# Patient Record
Sex: MALE | Race: BLACK OR AFRICAN AMERICAN | NOT HISPANIC OR LATINO | Employment: UNEMPLOYED | URBAN - METROPOLITAN AREA
[De-identification: names, ages, dates, MRNs, and addresses within clinical notes are randomized per-mention and may not be internally consistent; named-entity substitution may affect disease eponyms.]

---

## 2024-07-09 ENCOUNTER — HOSPITAL ENCOUNTER (OUTPATIENT)
Facility: HOSPITAL | Age: 34
Setting detail: OBSERVATION
Discharge: HOME | End: 2024-07-10
Attending: STUDENT IN AN ORGANIZED HEALTH CARE EDUCATION/TRAINING PROGRAM | Admitting: HOSPITALIST
Payer: COMMERCIAL

## 2024-07-09 ENCOUNTER — APPOINTMENT (OUTPATIENT)
Dept: CT IMAGING | Facility: HOSPITAL | Age: 34
End: 2024-07-09
Payer: COMMERCIAL

## 2024-07-09 DIAGNOSIS — R07.9 CHEST PAIN, UNSPECIFIED TYPE: ICD-10-CM

## 2024-07-09 DIAGNOSIS — I26.99 PULMONARY EMBOLISM WITHOUT ACUTE COR PULMONALE, UNSPECIFIED CHRONICITY, UNSPECIFIED PULMONARY EMBOLISM TYPE: Primary | ICD-10-CM

## 2024-07-09 LAB
BASOPHILS # BLD AUTO: 0.02 10*3/MM3 (ref 0–0.2)
BASOPHILS NFR BLD AUTO: 0.3 % (ref 0–1.5)
DEPRECATED RDW RBC AUTO: 43.1 FL (ref 37–54)
EOSINOPHIL # BLD AUTO: 0.34 10*3/MM3 (ref 0–0.4)
EOSINOPHIL NFR BLD AUTO: 4.3 % (ref 0.3–6.2)
ERYTHROCYTE [DISTWIDTH] IN BLOOD BY AUTOMATED COUNT: 13 % (ref 12.3–15.4)
HCT VFR BLD AUTO: 42.3 % (ref 37.5–51)
HGB BLD-MCNC: 14 G/DL (ref 13–17.7)
IMM GRANULOCYTES # BLD AUTO: 0.04 10*3/MM3 (ref 0–0.05)
IMM GRANULOCYTES NFR BLD AUTO: 0.5 % (ref 0–0.5)
LYMPHOCYTES # BLD AUTO: 2.6 10*3/MM3 (ref 0.7–3.1)
LYMPHOCYTES NFR BLD AUTO: 33.1 % (ref 19.6–45.3)
MCH RBC QN AUTO: 29 PG (ref 26.6–33)
MCHC RBC AUTO-ENTMCNC: 33.1 G/DL (ref 31.5–35.7)
MCV RBC AUTO: 87.6 FL (ref 79–97)
MONOCYTES # BLD AUTO: 0.76 10*3/MM3 (ref 0.1–0.9)
MONOCYTES NFR BLD AUTO: 9.7 % (ref 5–12)
NEUTROPHILS NFR BLD AUTO: 4.09 10*3/MM3 (ref 1.7–7)
NEUTROPHILS NFR BLD AUTO: 52.1 % (ref 42.7–76)
PLATELET # BLD AUTO: 189 10*3/MM3 (ref 140–450)
PMV BLD AUTO: 10.7 FL (ref 6–12)
RBC # BLD AUTO: 4.83 10*6/MM3 (ref 4.14–5.8)
WBC NRBC COR # BLD AUTO: 7.85 10*3/MM3 (ref 3.4–10.8)

## 2024-07-09 PROCEDURE — 93005 ELECTROCARDIOGRAM TRACING: CPT | Performed by: STUDENT IN AN ORGANIZED HEALTH CARE EDUCATION/TRAINING PROGRAM

## 2024-07-09 PROCEDURE — 85610 PROTHROMBIN TIME: CPT | Performed by: STUDENT IN AN ORGANIZED HEALTH CARE EDUCATION/TRAINING PROGRAM

## 2024-07-09 PROCEDURE — 83690 ASSAY OF LIPASE: CPT | Performed by: STUDENT IN AN ORGANIZED HEALTH CARE EDUCATION/TRAINING PROGRAM

## 2024-07-09 PROCEDURE — 85730 THROMBOPLASTIN TIME PARTIAL: CPT | Performed by: STUDENT IN AN ORGANIZED HEALTH CARE EDUCATION/TRAINING PROGRAM

## 2024-07-09 PROCEDURE — 80050 GENERAL HEALTH PANEL: CPT | Performed by: STUDENT IN AN ORGANIZED HEALTH CARE EDUCATION/TRAINING PROGRAM

## 2024-07-09 PROCEDURE — 99285 EMERGENCY DEPT VISIT HI MDM: CPT

## 2024-07-09 PROCEDURE — 93010 ELECTROCARDIOGRAM REPORT: CPT | Performed by: INTERNAL MEDICINE

## 2024-07-09 PROCEDURE — 83036 HEMOGLOBIN GLYCOSYLATED A1C: CPT | Performed by: NURSE PRACTITIONER

## 2024-07-09 PROCEDURE — 84484 ASSAY OF TROPONIN QUANT: CPT | Performed by: STUDENT IN AN ORGANIZED HEALTH CARE EDUCATION/TRAINING PROGRAM

## 2024-07-09 PROCEDURE — 71275 CT ANGIOGRAPHY CHEST: CPT

## 2024-07-09 NOTE — Clinical Note
Level of Care: Telemetry [5]   Diagnosis: Pulmonary embolism [[id number]]   Admitting Physician: PRATIBHA ARNDT [[id number]]

## 2024-07-10 ENCOUNTER — APPOINTMENT (OUTPATIENT)
Dept: ULTRASOUND IMAGING | Facility: HOSPITAL | Age: 34
End: 2024-07-10
Payer: COMMERCIAL

## 2024-07-10 VITALS
SYSTOLIC BLOOD PRESSURE: 132 MMHG | DIASTOLIC BLOOD PRESSURE: 83 MMHG | OXYGEN SATURATION: 97 % | RESPIRATION RATE: 16 BRPM | HEIGHT: 70 IN | WEIGHT: 283.4 LBS | TEMPERATURE: 97.1 F | HEART RATE: 74 BPM | BODY MASS INDEX: 40.57 KG/M2

## 2024-07-10 PROBLEM — E78.2 MIXED HYPERLIPIDEMIA: Status: ACTIVE | Noted: 2024-01-10

## 2024-07-10 PROBLEM — S43.409A SPRAIN OF SHOULDER: Status: ACTIVE | Noted: 2024-01-10

## 2024-07-10 PROBLEM — I48.91 ATRIAL FIBRILLATION: Status: ACTIVE | Noted: 2019-06-26

## 2024-07-10 PROBLEM — G62.9 NEUROPATHY: Status: ACTIVE | Noted: 2024-01-10

## 2024-07-10 PROBLEM — I26.99 PULMONARY EMBOLISM: Status: ACTIVE | Noted: 2024-07-10

## 2024-07-10 PROBLEM — I10 ESSENTIAL HYPERTENSION: Status: ACTIVE | Noted: 2019-06-26

## 2024-07-10 LAB
ALBUMIN SERPL-MCNC: 4 G/DL (ref 3.5–5.2)
ALBUMIN/GLOB SERPL: 1.3 G/DL
ALP SERPL-CCNC: 79 U/L (ref 39–117)
ALT SERPL W P-5'-P-CCNC: 18 U/L (ref 1–41)
AMPHET+METHAMPHET UR QL: POSITIVE
AMPHETAMINES UR QL: NEGATIVE
ANION GAP SERPL CALCULATED.3IONS-SCNC: 14.5 MMOL/L (ref 5–15)
APTT PPP: 24.7 SECONDS (ref 24.3–38.1)
AST SERPL-CCNC: 17 U/L (ref 1–40)
BARBITURATES UR QL SCN: NEGATIVE
BENZODIAZ UR QL SCN: NEGATIVE
BILIRUB SERPL-MCNC: 0.4 MG/DL (ref 0–1.2)
BILIRUB UR QL STRIP: NEGATIVE
BUN SERPL-MCNC: 9 MG/DL (ref 6–20)
BUN/CREAT SERPL: 8.5 (ref 7–25)
BUPRENORPHINE SERPL-MCNC: NEGATIVE NG/ML
CALCIUM SPEC-SCNC: 9 MG/DL (ref 8.6–10.5)
CANNABINOIDS SERPL QL: POSITIVE
CHLORIDE SERPL-SCNC: 108 MMOL/L (ref 98–107)
CLARITY UR: CLEAR
CO2 SERPL-SCNC: 20.5 MMOL/L (ref 22–29)
COCAINE UR QL: NEGATIVE
COLOR UR: YELLOW
CREAT SERPL-MCNC: 1.06 MG/DL (ref 0.76–1.27)
EGFRCR SERPLBLD CKD-EPI 2021: 95.6 ML/MIN/1.73
GLOBULIN UR ELPH-MCNC: 3 GM/DL
GLUCOSE SERPL-MCNC: 160 MG/DL (ref 65–99)
GLUCOSE UR STRIP-MCNC: NEGATIVE MG/DL
HBA1C MFR BLD: 7.85 % (ref 4.8–5.6)
HGB UR QL STRIP.AUTO: NEGATIVE
INR PPP: 1.02 (ref 0.9–1.1)
KETONES UR QL STRIP: ABNORMAL
LEUKOCYTE ESTERASE UR QL STRIP.AUTO: NEGATIVE
LIPASE SERPL-CCNC: 28 U/L (ref 13–60)
METHADONE UR QL SCN: NEGATIVE
NITRITE UR QL STRIP: NEGATIVE
OPIATES UR QL: NEGATIVE
OXYCODONE UR QL SCN: NEGATIVE
PCP UR QL SCN: NEGATIVE
PH UR STRIP.AUTO: 6.5 [PH] (ref 4.5–8)
POTASSIUM SERPL-SCNC: 3.6 MMOL/L (ref 3.5–5.2)
PROT SERPL-MCNC: 7 G/DL (ref 6–8.5)
PROT UR QL STRIP: NEGATIVE
PROTHROMBIN TIME: 13.4 SECONDS (ref 12.1–15)
QT INTERVAL: 343 MS
QTC INTERVAL: 402 MS
SODIUM SERPL-SCNC: 143 MMOL/L (ref 136–145)
SP GR UR STRIP: 1.02 (ref 1–1.03)
TRICYCLICS UR QL SCN: NEGATIVE
TROPONIN T SERPL HS-MCNC: 10 NG/L
TSH SERPL DL<=0.05 MIU/L-ACNC: 0.5 UIU/ML (ref 0.27–4.2)
UROBILINOGEN UR QL STRIP: ABNORMAL

## 2024-07-10 PROCEDURE — 80306 DRUG TEST PRSMV INSTRMNT: CPT | Performed by: NURSE PRACTITIONER

## 2024-07-10 PROCEDURE — 93970 EXTREMITY STUDY: CPT

## 2024-07-10 PROCEDURE — 25010000002 ENOXAPARIN PER 10 MG: Performed by: NURSE PRACTITIONER

## 2024-07-10 PROCEDURE — G0378 HOSPITAL OBSERVATION PER HR: HCPCS

## 2024-07-10 PROCEDURE — 96372 THER/PROPH/DIAG INJ SC/IM: CPT

## 2024-07-10 PROCEDURE — 81003 URINALYSIS AUTO W/O SCOPE: CPT | Performed by: NURSE PRACTITIONER

## 2024-07-10 PROCEDURE — 99235 HOSP IP/OBS SAME DATE MOD 70: CPT | Performed by: HOSPITALIST

## 2024-07-10 PROCEDURE — 25510000001 IOPAMIDOL PER 1 ML: Performed by: STUDENT IN AN ORGANIZED HEALTH CARE EDUCATION/TRAINING PROGRAM

## 2024-07-10 PROCEDURE — 94799 UNLISTED PULMONARY SVC/PX: CPT

## 2024-07-10 RX ORDER — ACETAMINOPHEN 650 MG/1
650 SUPPOSITORY RECTAL EVERY 4 HOURS PRN
Status: DISCONTINUED | OUTPATIENT
Start: 2024-07-10 | End: 2024-07-10 | Stop reason: HOSPADM

## 2024-07-10 RX ORDER — BISACODYL 5 MG/1
5 TABLET, DELAYED RELEASE ORAL DAILY PRN
Status: DISCONTINUED | OUTPATIENT
Start: 2024-07-10 | End: 2024-07-10 | Stop reason: HOSPADM

## 2024-07-10 RX ORDER — SODIUM CHLORIDE 9 MG/ML
40 INJECTION, SOLUTION INTRAVENOUS AS NEEDED
Status: DISCONTINUED | OUTPATIENT
Start: 2024-07-10 | End: 2024-07-10 | Stop reason: HOSPADM

## 2024-07-10 RX ORDER — ACETAMINOPHEN 325 MG/1
650 TABLET ORAL EVERY 4 HOURS PRN
Status: DISCONTINUED | OUTPATIENT
Start: 2024-07-10 | End: 2024-07-10 | Stop reason: HOSPADM

## 2024-07-10 RX ORDER — SODIUM CHLORIDE 0.9 % (FLUSH) 0.9 %
10 SYRINGE (ML) INJECTION EVERY 12 HOURS SCHEDULED
Status: DISCONTINUED | OUTPATIENT
Start: 2024-07-10 | End: 2024-07-10 | Stop reason: HOSPADM

## 2024-07-10 RX ORDER — HEPARIN SODIUM 10000 [USP'U]/100ML
11.5 INJECTION, SOLUTION INTRAVENOUS
Status: DISCONTINUED | OUTPATIENT
Start: 2024-07-10 | End: 2024-07-10 | Stop reason: SDUPTHER

## 2024-07-10 RX ORDER — ENOXAPARIN SODIUM 150 MG/ML
1 INJECTION SUBCUTANEOUS EVERY 12 HOURS
Status: DISCONTINUED | OUTPATIENT
Start: 2024-07-10 | End: 2024-07-10

## 2024-07-10 RX ORDER — BISACODYL 10 MG
10 SUPPOSITORY, RECTAL RECTAL DAILY PRN
Status: DISCONTINUED | OUTPATIENT
Start: 2024-07-10 | End: 2024-07-10 | Stop reason: HOSPADM

## 2024-07-10 RX ORDER — AMOXICILLIN 250 MG
2 CAPSULE ORAL 2 TIMES DAILY PRN
Status: DISCONTINUED | OUTPATIENT
Start: 2024-07-10 | End: 2024-07-10 | Stop reason: HOSPADM

## 2024-07-10 RX ORDER — GABAPENTIN 300 MG/1
300 CAPSULE ORAL 2 TIMES DAILY
Status: DISCONTINUED | OUTPATIENT
Start: 2024-07-10 | End: 2024-07-10 | Stop reason: HOSPADM

## 2024-07-10 RX ORDER — KETOROLAC TROMETHAMINE 30 MG/ML
15 INJECTION, SOLUTION INTRAMUSCULAR; INTRAVENOUS ONCE
Status: DISCONTINUED | OUTPATIENT
Start: 2024-07-10 | End: 2024-07-10

## 2024-07-10 RX ORDER — TRAMADOL HYDROCHLORIDE 50 MG/1
50 TABLET ORAL EVERY 6 HOURS PRN
Status: DISCONTINUED | OUTPATIENT
Start: 2024-07-10 | End: 2024-07-10 | Stop reason: HOSPADM

## 2024-07-10 RX ORDER — DILTIAZEM HYDROCHLORIDE 60 MG/1
60 TABLET, FILM COATED ORAL EVERY 12 HOURS SCHEDULED
Status: DISCONTINUED | OUTPATIENT
Start: 2024-07-10 | End: 2024-07-10 | Stop reason: HOSPADM

## 2024-07-10 RX ORDER — ATORVASTATIN CALCIUM 20 MG/1
20 TABLET, FILM COATED ORAL DAILY
COMMUNITY

## 2024-07-10 RX ORDER — POLYETHYLENE GLYCOL 3350 17 G/17G
17 POWDER, FOR SOLUTION ORAL DAILY PRN
Status: DISCONTINUED | OUTPATIENT
Start: 2024-07-10 | End: 2024-07-10 | Stop reason: HOSPADM

## 2024-07-10 RX ORDER — ONDANSETRON 4 MG/1
4 TABLET, ORALLY DISINTEGRATING ORAL EVERY 6 HOURS PRN
Status: DISCONTINUED | OUTPATIENT
Start: 2024-07-10 | End: 2024-07-10 | Stop reason: HOSPADM

## 2024-07-10 RX ORDER — SODIUM CHLORIDE 0.9 % (FLUSH) 0.9 %
10 SYRINGE (ML) INJECTION AS NEEDED
Status: DISCONTINUED | OUTPATIENT
Start: 2024-07-10 | End: 2024-07-10 | Stop reason: HOSPADM

## 2024-07-10 RX ORDER — ONDANSETRON 2 MG/ML
4 INJECTION INTRAMUSCULAR; INTRAVENOUS EVERY 6 HOURS PRN
Status: DISCONTINUED | OUTPATIENT
Start: 2024-07-10 | End: 2024-07-10 | Stop reason: HOSPADM

## 2024-07-10 RX ORDER — HEPARIN SODIUM 5000 [USP'U]/ML
5000 INJECTION, SOLUTION INTRAVENOUS; SUBCUTANEOUS AS NEEDED
Status: DISCONTINUED | OUTPATIENT
Start: 2024-07-10 | End: 2024-07-10 | Stop reason: SDUPTHER

## 2024-07-10 RX ORDER — ATORVASTATIN CALCIUM 20 MG/1
20 TABLET, FILM COATED ORAL DAILY
Status: DISCONTINUED | OUTPATIENT
Start: 2024-07-10 | End: 2024-07-10 | Stop reason: HOSPADM

## 2024-07-10 RX ORDER — DILTIAZEM HYDROCHLORIDE 60 MG/1
60 TABLET, FILM COATED ORAL ONCE
COMMUNITY

## 2024-07-10 RX ORDER — ACETAMINOPHEN 160 MG/5ML
650 SOLUTION ORAL EVERY 4 HOURS PRN
Status: DISCONTINUED | OUTPATIENT
Start: 2024-07-10 | End: 2024-07-10 | Stop reason: HOSPADM

## 2024-07-10 RX ORDER — HEPARIN SODIUM 5000 [USP'U]/ML
10000 INJECTION, SOLUTION INTRAVENOUS; SUBCUTANEOUS ONCE
Status: DISCONTINUED | OUTPATIENT
Start: 2024-07-10 | End: 2024-07-10 | Stop reason: SDUPTHER

## 2024-07-10 RX ORDER — GABAPENTIN 300 MG/1
300 CAPSULE ORAL 2 TIMES DAILY
COMMUNITY

## 2024-07-10 RX ADMIN — Medication 10 ML: at 02:53

## 2024-07-10 RX ADMIN — Medication 10 ML: at 09:38

## 2024-07-10 RX ADMIN — CARIPRAZINE 6 MG: 1.5 CAPSULE, GELATIN COATED ORAL at 08:34

## 2024-07-10 RX ADMIN — METFORMIN HYDROCHLORIDE 1000 MG: 500 TABLET, FILM COATED ORAL at 08:35

## 2024-07-10 RX ADMIN — ATORVASTATIN CALCIUM 20 MG: 20 TABLET, FILM COATED ORAL at 08:34

## 2024-07-10 RX ADMIN — IOPAMIDOL 100 ML: 755 INJECTION, SOLUTION INTRAVENOUS at 00:20

## 2024-07-10 RX ADMIN — ENOXAPARIN SODIUM 135 MG: 150 INJECTION SUBCUTANEOUS at 02:44

## 2024-07-10 RX ADMIN — TRAMADOL HYDROCHLORIDE 50 MG: 50 TABLET ORAL at 05:31

## 2024-07-10 RX ADMIN — DILTIAZEM HYDROCHLORIDE 60 MG: 60 TABLET, FILM COATED ORAL at 08:35

## 2024-07-10 RX ADMIN — GABAPENTIN 300 MG: 300 CAPSULE ORAL at 08:34

## 2024-07-10 NOTE — CASE MANAGEMENT/SOCIAL WORK
Case Management Discharge Note      Final Note: Discharged home.         Selected Continued Care - Discharged on 7/10/2024 Admission date: 7/9/2024 - Discharge disposition: Home or Self Care      Destination    No services have been selected for the patient.                Durable Medical Equipment    No services have been selected for the patient.                Dialysis/Infusion    No services have been selected for the patient.                Home Medical Care    No services have been selected for the patient.                Therapy    No services have been selected for the patient.                Community Resources    No services have been selected for the patient.                Community & DME    No services have been selected for the patient.                         Final Discharge Disposition Code: 01 - home or self-care

## 2024-07-10 NOTE — ED PROVIDER NOTES
Subjective   History of Present Illness  Pt is a 33 y.o. male with PMH as listed who presents for   Chief Complaint   Patient presents with    Chest Pain     Right sided chest pain, hx of derek PE and L DVT       Patient is a 33-year-old male presents for right-sided chest pain for the past day with increased pain with deep breaths.  Not short of breath but pain takes his breath away when he takes a deep breath.  No fever, nausea vomiting no diaphoresis.  History of prior PE 1 year ago and is on Xarelto for this as well as for history of A-fib.  States he is compliant with his medications.  His current symptoms feel similar to when he presented for PE 1 year ago.     Review of Systems    History reviewed. No pertinent past medical history.    Allergies   Allergen Reactions    Ritalin [Methylphenidate] Anaphylaxis    Bee Venom Hives and Swelling       History reviewed. No pertinent surgical history.    History reviewed. No pertinent family history.    Social History     Socioeconomic History    Marital status: Single           Objective   Physical Exam  Constitutional:       Appearance: Normal appearance.   HENT:      Head: Normocephalic and atraumatic.      Mouth/Throat:      Mouth: Mucous membranes are moist.      Pharynx: Oropharynx is clear.   Eyes:      Conjunctiva/sclera: Conjunctivae normal.   Cardiovascular:      Rate and Rhythm: Normal rate and regular rhythm.      Heart sounds: Normal heart sounds. Murmur: tender right side chest wall.   Pulmonary:      Effort: Pulmonary effort is normal.      Breath sounds: Normal breath sounds.   Chest:      Chest wall: Tenderness present.   Abdominal:      General: Abdomen is flat.   Musculoskeletal:      Cervical back: Neck supple.   Skin:     General: Skin is warm and dry.   Neurological:      Mental Status: He is alert.   Psychiatric:         Mood and Affect: Mood normal.         Procedures           ED Course  ED Course as of 07/10/24 0057   Tue Jul 09, 2024   8407  Patient is a 33-year-old male presents for right-sided chest pain for the past day with increased pain with deep breaths.  Not short of breath but pain takes his breath away when he takes a deep breath.  No fever, nausea vomiting no diaphoresis.  History of prior PE 1 year ago and is on Xarelto for this as well as for history of A-fib.  States he is compliant with his medications.  His current symptoms feel similar to when he presented for PE 1 year ago.  Will obtain CBC CMP lipase coags troponin EKG and CTA chest. [JF]   Wed Jul 10, 2024   0048 Reassessment while CTA pending and lab work resulted discussed normal lab work including troponin.  Patient states that his chest pain has been ongoing for several months if not longer and he has had negative stress test and other cardiac workups in [facility].  Patient resting comfortably during my assessment. [JF]   0056 CTA does show right-sided PE due to the patient's pain being on the right side.  No prior CT available for comparison will treat as Xarelto failure and started on heparin bolus and admit for further management.  Discussed with Kinsey Jack, hospital service who agrees to admit patient for further care.  Spoke with patient, he understands and agrees with plan of care.  All questions answered. [JF]      ED Course User Index  [JF] Reinaldo Castillo MD                                             Medical Decision Making  My differential diagnosis for chest pain includes but is not limited to:  Muscle strain, costochondritis, myositis, pleurisy, rib fracture, intercostal neuritis, herpes zoster, tumor, myocardial infarction, coronary syndrome, unstable angina, angina, aortic dissection, mitral valve prolapse, pericarditis, palpitations, pulmonary embolus, pneumonia, pneumothorax, lung cancer, GERD, esophagitis, esophageal spasm      Problems Addressed:  Chest pain, unspecified type: complicated acute illness or injury  Pulmonary embolism without acute cor  pulmonale, unspecified chronicity, unspecified pulmonary embolism type: complicated acute illness or injury    Amount and/or Complexity of Data Reviewed  Labs: ordered. Decision-making details documented in ED Course.  Radiology: ordered.     Details: CT shows right-sided PE  ECG/medicine tests: ordered.     Details: EKG  7/9/2024 2324  Rhythm sinus, rate 82  Normal axis, normal intervals, no ST changes  EKG interpreted by me contemporaneously by me with care  Discussion of management or test interpretation with external provider(s): Spoke with NISSA Lou for hospital service who agrees to admit patient for further management.    Risk  Prescription drug management.  Decision regarding hospitalization.        Final diagnoses:   Pulmonary embolism without acute cor pulmonale, unspecified chronicity, unspecified pulmonary embolism type   Chest pain, unspecified type       ED Disposition  ED Disposition       ED Disposition   Decision to Admit    Condition   --    Comment   Level of Care: Telemetry [5]   Admitting Physician: PRATIBHA ARNDT [[id number]]                 No follow-up provider specified.       Medication List      No changes were made to your prescriptions during this visit.            Reinaldo Castillo MD  07/10/24 0057

## 2024-07-10 NOTE — NURSING NOTE
1020: Patient still has not returned to the hospital. His discharge packet and Eliquis are at the nursing station.   Removing patient from EPIC and housekeeping will be cleaning the room.

## 2024-07-10 NOTE — DISCHARGE SUMMARY
"Kirk Peterson  1990  [id number]    Hospitalists Discharge Summary    Date of Admission: 7/9/2024  Date of Discharge:  7/10/2024    Primary Discharge Diagnoses:  RLL PE  DVT of the Left Femoral Vein  Illicit Drug Use  Suspected Medical Non-compliance    Secondary Discharge Diagnoses:  Diabetes Mellitus, Type 2 in Obese A1c of 7.85 Body mass index is 40.66 kg/m².  Essential Hypertension  Dyslipidemia    History of Present Illness (taken from H&P):  The patient is a 33-year-old male who presented to the emergency department secondary to right sided chest pain that has been ongoing for \"years\" but worsened in the past 2 to 3 days. He decided to come to ER today since he was already here with his friend's mother who is admitted (who he lives with). He reports first episode of PE and DVT was in 2016 and was told he would take blood thinners for life.  Approximately 1 year ago he had recurrence of PE and was placed on Xarelto 15 mg tablets.  He reports that he did not feel they were working because his left leg remained swollen so he stopped taking it approximately 2 months ago.  2 weeks ago he was admitted at [facility] for \"mental health crisis\" and was incidentally diagnosed with recurrent PE and started on 20 mg Xarelto.  He notes no starter pack was given and that he has been compliant for approximately 1-1/2 to 2 weeks on Xarelto 20 mg tablets.  He reports he has never had hematology referral for further investigation.  He also reports PE and DVTs were unprovoked.     He reports that he has had a very low appetite over the past 2 to 3 years and is lost 200 pounds.     He vapes and smokes cigars, drinks alcohol occasionally and uses marijuana \"as much as I can\" with last use approximately 3 days ago.  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       Only significant lab in ER is glucose 160.  CTA chest shows likely right lower lobe small PE.  Admission was requested due to Xarelto failure.     He has " "a history of PE/DVT, atrial fibrillation, hyperlipidemia, hypertension, DM 2, obesity     He otherwise currently denies f/c/headache/rhinorrhea/nasal congestion/lightheadedness/syncopal sensation/cough/soa/n/v/d/chest pain/abdominal pain/recent illness/sick exposures/change in bowel or bladder habits/bloody emesis or bloody stools or any other new concerns.    Hospital Course:  Mr. Peterson was admitted to the medical surgical floor.  Lower extremity Dopplers were positive for a DVT in the left femoral vein.  He was initially started on Lovenox, however I changed this to Eliquis.  I I have had some clinical experience with Xarelto of \"failure\", however, as noted by my colleague overnight, there is suspicion he has not taken his medication.  This came to late this morning as I was trying to ascertain his ability to fill his prescription over in [place] since [place].  He became very argumentative with me repeatedly telling me that he cannot fill his prescriptions in [place] and he may not be able to get over to [place] to  his prescription.  I explained that when you have blood clots, you need to be compliant with your medications.  He again began very argumentative with me telling me I was \"rude\" for waking him up at 9:30 in the morning and that he would  his medications.  As I was preparing his discharge, he left the hospital.  His prescription for Eliquis was sent to [facility] in [place] where he said he was fills his prescriptions.    PCP  Patient Care Team:  Provider, No Known as PCP - General    Consults:   Consults       No orders found for last 30 day(s).            Operations and Procedures Performed:       US Venous Doppler Lower Extremity Bilateral (duplex)    Result Date: 7/10/2024  Narrative: US VENOUS DOPPLER LOWER EXTREMITY BILATERAL (DUPLEX) Date of Exam: 7/10/2024 7:41 AM EDT Indication: r/o DVT. Comparison: None available. Technique:  Routine gray scale, " color flow and spectral Doppler analysis of bilateral lower extremities. A complete venous study was performed with image documentation obtained per protocol. Findings: Exam is positive for deep venous thrombosis of the left femoral vein extending through the popliteal vein and involving the anterior and posterior tibial veins as well as the peroneal vein. There is no evidence of left common femoral, deep femoral, or saphenous vein thrombosis. There is normal compressibility and color flow within the right lower extremity venous structures.     Impression: Impression: Findings are positive for deep venous thrombosis involving the left femoral vein and extending distally to the anterior tibial, posterior tibial, and peroneal veins. No evidence of right lower extremity venous thrombosis. Electronically Signed: Farrukh Toro MD  7/10/2024 8:40 AM EDT  Workstation ID: [id number]    CT Angiogram Chest    Result Date: 7/10/2024  Narrative: CT ANGIOGRAM CHEST Date of Exam: 7/10/2024 12:05 AM EDT Indication: chest pain, hx of PE with similar presentation 1 year ago. Comparison: None available. Technique: CTA of the chest was performed before and after the uneventful intravenous administration of iodinated contrast. Reconstructed coronal and sagittal images were also obtained. In addition, a 3-D volume rendered image was created for interpretation. Automated exposure control and iterative reconstruction methods were used. Findings: There is a suboptimal contrast bolus in the pulmonary artery. Despite this, there does appear to be high likelihood of a small pulmonary embolism within a segmental branch pulmonary artery to the right lower lobe posteriorly best seen on images 88-90. No  large central or saddle embolus. No evidence of right heart strain. There is no pneumothorax, pleural effusion or focal airspace consolidation. No significant pleural disease. Airways are patent. Thyroid, trachea, esophagus, heart size, aorta,  aortic branch vessels, main pulmonary artery and coronary arteries appear within normal limits. No coronary artery calcification. No mediastinal lymphadenopathy or pericardial effusion. No acute findings in the superficial soft tissues or within the limited images of the upper abdomen. No acute osseous abnormality or destructive bone lesion. No significant thoracic degenerative changes.     Impression: Impression: Suboptimal contrast bolus, but there is high likelihood of a small pulmonary embolism within a segmental branch pulmonary artery to the right lower lobe. No evidence of right heart strain. Electronically Signed: Farrukh Chisholm MD  7/10/2024 12:34 AM EDT  Workstation ID: [id number]     Allergies:  is allergic to ritalin [methylphenidate] and bee venom.    Srinivasa  reviewed    Discharge Medications:     Discharge Medications        New Medications        Instructions Start Date   apixaban 5 MG tablet tablet  Commonly known as: ELIQUIS   Take 2 tablets by mouth Every 12 (Twelve) Hours for 7 days, THEN 1 tablet Every 12 (Twelve) Hours for 14 days. Indications: DVT/PE (active thrombosis)   Start Date: July 10, 2024            Continue These Medications        Instructions Start Date   atorvastatin 20 MG tablet  Commonly known as: LIPITOR   20 mg, Oral, Daily      Cariprazine HCl 3 MG capsule capsule  Commonly known as: VRAYLAR   10 mg, Oral, Daily      dilTIAZem 60 MG tablet  Commonly known as: CARDIZEM   60 mg, Oral, Once      gabapentin 300 MG capsule  Commonly known as: NEURONTIN   300 mg, Oral, 2 Times Daily      metFORMIN 1000 MG tablet  Commonly known as: GLUCOPHAGE   1,000 mg, Oral, 2 Times Daily With Meals             Stop These Medications      rivaroxaban 20 MG tablet  Commonly known as: XARELTO              Last Lab Results:   Lab Results (most recent)       Procedure Component Value Units Date/Time    Hemoglobin A1c [[id number]]  (Abnormal) Collected: 07/09/24 2341    Specimen: Blood Updated: 07/10/24  0319     Hemoglobin A1C 7.85 %     Narrative:      Hemoglobin A1C Ranges:    Increased Risk for Diabetes  5.7% to 6.4%  Diabetes                     >= 6.5%  Diabetic Goal                < 7.0%    TSH [[id number]]  (Normal) Collected: 07/09/24 2341    Specimen: Blood Updated: 07/10/24 0246     TSH 0.503 uIU/mL     Urine Drug Screen - Urine, Clean Catch [[id number]]  (Abnormal) Collected: 07/10/24 0205    Specimen: Urine, Clean Catch Updated: 07/10/24 0239     THC, Screen, Urine Positive     Phencyclidine (PCP), Urine Negative     Cocaine Screen, Urine Negative     Methamphetamine, Ur Negative     Opiate Screen Negative     Amphetamine Screen, Urine Positive     Benzodiazepine Screen, Urine Negative     Tricyclic Antidepressants Screen Negative     Methadone Screen, Urine Negative     Barbiturates Screen, Urine Negative     Oxycodone Screen, Urine Negative     Buprenorphine, Screen, Urine Negative    Narrative:      Cutoff For Drugs Screened:    Amphetamines               500 ng/ml  Barbiturates               200 ng/ml  Benzodiazepines            150 ng/ml  Cocaine                    150 ng/ml  Methadone                  200 ng/ml  Opiates                    100 ng/ml  Phencyclidine               25 ng/ml  THC                         50 ng/ml  Methamphetamine            500 ng/ml  Tricyclic Antidepressants  300 ng/ml  Oxycodone                  100 ng/ml  Buprenorphine               10 ng/ml    The normal value for all drugs tested is negative. This report includes unconfirmed screening results, with the cutoff values listed, to be used for medical treatment purposes only.  Unconfirmed results must not be used for non-medical purposes such as employment or legal testing.  Clinical consideration should be applied to any drug of abuse test, particularly when unconfirmed results are used.      Urinalysis With Microscopic If Indicated (No Culture) - Urine, Clean Catch [[id number]]  (Abnormal) Collected: 07/10/24 0205     Specimen: Urine, Clean Catch Updated: 07/10/24 0224     Color, UA Yellow     Appearance, UA Clear     pH, UA 6.5     Specific Gravity, UA 1.020     Glucose, UA Negative     Ketones, UA Trace     Bilirubin, UA Negative     Blood, UA Negative     Protein, UA Negative     Leuk Esterase, UA Negative     Nitrite, UA Negative     Urobilinogen, UA 2.0 E.U./dL    Narrative:      Urine microscopic not indicated.    aPTT [[id number]]  (Normal) Collected: 07/09/24 2341    Specimen: Blood Updated: 07/10/24 0103     PTT 24.7 seconds     Narrative:      PTT = The equivalent PTT values for the therapeutic range of heparin levels at 0.1 to 0.7 U/ml are 53 to 110 seconds.      Lipase [[id number]]  (Normal) Collected: 07/09/24 2341    Specimen: Blood Updated: 07/10/24 0009     Lipase 28 U/L     High Sensitivity Troponin T [[id number]]  (Normal) Collected: 07/09/24 2341    Specimen: Blood Updated: 07/10/24 0009     HS Troponin T 10 ng/L     Narrative:      High Sensitive Troponin T Reference Range:  <14.0 ng/L- Negative Female for AMI  <22.0 ng/L- Negative Male for AMI  >=14 - Abnormal Female indicating possible myocardial injury.  >=22 - Abnormal Male indicating possible myocardial injury.   Clinicians would have to utilize clinical acumen, EKG, Troponin, and serial changes to determine if it is an Acute Myocardial Infarction or myocardial injury due to an underlying chronic condition.         Comprehensive Metabolic Panel [[id number]]  (Abnormal) Collected: 07/09/24 2341    Specimen: Blood Updated: 07/10/24 0009     Glucose 160 mg/dL      BUN 9 mg/dL      Creatinine 1.06 mg/dL      Sodium 143 mmol/L      Potassium 3.6 mmol/L      Chloride 108 mmol/L      CO2 20.5 mmol/L      Calcium 9.0 mg/dL      Total Protein 7.0 g/dL      Albumin 4.0 g/dL      ALT (SGPT) 18 U/L      AST (SGOT) 17 U/L      Alkaline Phosphatase 79 U/L      Total Bilirubin 0.4 mg/dL      Globulin 3.0 gm/dL      A/G Ratio 1.3 g/dL      BUN/Creatinine Ratio 8.5     Anion  Gap 14.5 mmol/L      eGFR 95.6 mL/min/1.73     Narrative:      GFR Normal >60  Chronic Kidney Disease <60  Kidney Failure <15      Protime-INR [[id number]]  (Normal) Collected: 07/09/24 2341    Specimen: Blood Updated: 07/10/24 0007     Protime 13.4 Seconds      INR 1.02    Narrative:      Therapeutic Ranges for INR: 2.0-3.0 (PT 20-30)                              2.5-3.5 (PT 25-34)    CBC & Differential [[id number]]  (Normal) Collected: 07/09/24 2341    Specimen: Blood Updated: 07/09/24 2349    Narrative:      The following orders were created for panel order CBC & Differential.  Procedure                               Abnormality         Status                     ---------                               -----------         ------                     CBC Auto Differential[[id number]]        Normal              Final result                 Please view results for these tests on the individual orders.    CBC Auto Differential [[id number]]  (Normal) Collected: 07/09/24 2341    Specimen: Blood Updated: 07/09/24 2349     WBC 7.85 10*3/mm3      RBC 4.83 10*6/mm3      Hemoglobin 14.0 g/dL      Hematocrit 42.3 %      MCV 87.6 fL      MCH 29.0 pg      MCHC 33.1 g/dL      RDW 13.0 %      RDW-SD 43.1 fl      MPV 10.7 fL      Platelets 189 10*3/mm3      Neutrophil % 52.1 %      Lymphocyte % 33.1 %      Monocyte % 9.7 %      Eosinophil % 4.3 %      Basophil % 0.3 %      Immature Grans % 0.5 %      Neutrophils, Absolute 4.09 10*3/mm3      Lymphocytes, Absolute 2.60 10*3/mm3      Monocytes, Absolute 0.76 10*3/mm3      Eosinophils, Absolute 0.34 10*3/mm3      Basophils, Absolute 0.02 10*3/mm3      Immature Grans, Absolute 0.04 10*3/mm3           Imaging Results (Most Recent)       Procedure Component Value Units Date/Time    US Venous Doppler Lower Extremity Bilateral (duplex) [[id number]] Collected: 07/10/24 0838     Updated: 07/10/24 0843    Narrative:      US VENOUS DOPPLER LOWER EXTREMITY BILATERAL (DUPLEX)    Date of Exam:  7/10/2024 7:41 AM EDT    Indication: r/o DVT.    Comparison: None available.    Technique:  Routine gray scale, color flow and spectral Doppler analysis of bilateral lower extremities. A complete venous study was performed with image documentation obtained per protocol.      Findings:  Exam is positive for deep venous thrombosis of the left femoral vein extending through the popliteal vein and involving the anterior and posterior tibial veins as well as the peroneal vein. There is no evidence of left common femoral, deep femoral, or   saphenous vein thrombosis.    There is normal compressibility and color flow within the right lower extremity venous structures.      Impression:      Impression:  Findings are positive for deep venous thrombosis involving the left femoral vein and extending distally to the anterior tibial, posterior tibial, and peroneal veins.    No evidence of right lower extremity venous thrombosis.        Electronically Signed: Farrukh Toro MD    7/10/2024 8:40 AM EDT    Workstation ID: [id number]    CT Angiogram Chest [[id number]] Collected: 07/10/24 0031     Updated: 07/10/24 0037    Narrative:      CT ANGIOGRAM CHEST    Date of Exam: 7/10/2024 12:05 AM EDT    Indication: chest pain, hx of PE with similar presentation 1 year ago.    Comparison: None available.    Technique: CTA of the chest was performed before and after the uneventful intravenous administration of iodinated contrast. Reconstructed coronal and sagittal images were also obtained. In addition, a 3-D volume rendered image was created for   interpretation. Automated exposure control and iterative reconstruction methods were used.      Findings:  There is a suboptimal contrast bolus in the pulmonary artery. Despite this, there does appear to be high likelihood of a small pulmonary embolism within a segmental branch pulmonary artery to the right lower lobe posteriorly best seen on images 88-90. No   large central or saddle embolus. No  evidence of right heart strain. There is no pneumothorax, pleural effusion or focal airspace consolidation. No significant pleural disease. Airways are patent.    Thyroid, trachea, esophagus, heart size, aorta, aortic branch vessels, main pulmonary artery and coronary arteries appear within normal limits. No coronary artery calcification. No mediastinal lymphadenopathy or pericardial effusion.    No acute findings in the superficial soft tissues or within the limited images of the upper abdomen. No acute osseous abnormality or destructive bone lesion. No significant thoracic degenerative changes.      Impression:      Impression:  Suboptimal contrast bolus, but there is high likelihood of a small pulmonary embolism within a segmental branch pulmonary artery to the right lower lobe. No evidence of right heart strain.        Electronically Signed: Farrukh Chisholm MD    7/10/2024 12:34 AM EDT    Workstation ID: [id number]            PROCEDURES      Condition on Discharge:  Stable    Physical Exam at Discharge  Vital Signs  Temp:  [97.1 °F (36.2 °C)-98.3 °F (36.8 °C)] 97.1 °F (36.2 °C)  Heart Rate:  [74-88] 74  Resp:  [16-18] 16  BP: (132-141)/(83-96) 132/83    Physical Exam:  Physical Exam   Constitutional: Patient appears well-developed and well-nourished and in no acute distress   Cardiovascular: Regular rate, regular rhythm, S1 normal and S2 normal.  Exam reveals no gallop and no friction rub.  No murmur heard.  Pulmonary/Chest: Lungs are clear to auscultation bilaterally. No respiratory distress. No wheezes. No rhonchi. No rales.   Abdominal: Morbidly obese. Soft. Bowel sounds are normal. There is no tenderness.   Musculoskeletal: Normal Muscle tone  Extremities: No edema.   Neurological: Cranial nerves II-XII are grossly intact with no focal deficits.    Discharge Disposition  Home    Visiting Nurse:    No     Home PT/OT:  No     Home Safety Evaluation:  No     DME  None    Discharge Diet:      Dietary Orders (From  admission, onward)       Start     Ordered    07/10/24 0158  Diet: Cardiac, Diabetic; Healthy Heart (2-3 Na+); Consistent Carbohydrate; Fluid Consistency: Thin (IDDSI 0)  Diet Effective Now        References:    Diet Order [place]   Question Answer Comment   Diets: Cardiac    Diets: Diabetic    Cardiac Diet: Healthy Heart (2-3 Na+)    Diabetic Diet: Consistent Carbohydrate    Fluid Consistency: Thin (IDDSI 0)        07/10/24 0157                    Activity at Discharge:  As tolerated    Follow-up Appointments  No future appointments.  Additional Instructions for the Follow-ups that You Need to Schedule       Discharge Follow-up with PCP   As directed       Currently Documented PCP:    Provider, No Known    PCP Phone Number:    [phone]     Follow Up Details: Needs to establish within 2 weeks.                Test Results Pending at Discharge  None     Sawyer Hazel MD  07/10/24  09:30 EDT    Time: <30 minutes

## 2024-07-10 NOTE — PROGRESS NOTES
"[facility] Clinical Pharmacy Services: Enoxaparin Consult    Kirk Peterson has a pharmacy consult to dose full-dose enoxaparin per Laura AZAR's request.     Indication: DVT/PE (active thrombosis) - Xarelto failure  Home Anticoagulation: Xarelto 20 mg oral daily     Relevant clinical data and objective history reviewed:  33 y.o. male 177.8 cm (70\") 131 kg (288 lb)   Body mass index is 41.32 kg/m².   Results from last 7 days   Lab Units 07/09/24  2341   PLATELETS 10*3/mm3 189     Estimated Creatinine Clearance: 136.1 mL/min (by C-G formula based on SCr of 1.06 mg/dL).    Assessment/Plan    Will start patient on  135 (1mg/kg) subcutaneous every 12 hours, adjusted for renal function. Consult order will be discontinued but pharmacy will continue to follow.     Lester Hawkins III, [facility]  Clinical Pharmacist    "

## 2024-07-10 NOTE — PLAN OF CARE
Goal Outcome Evaluation:  Plan of Care Reviewed With: patient        Progress: no change  Outcome Evaluation: New ER admit, VSS, rested some, lovenox started for PE, tolerating diet

## 2024-07-10 NOTE — NURSING NOTE
0950: Patient walked out. This RN was preparing pts discharge packet and the patient walked out.   The plan was to give the patient Eliquis to go home with for him to take at noon.  The IV was removed prior to the patient leaving, 2X2 gauze and tape was applied.   Anurag Barraza and Dr. Hazel informed.

## 2024-07-10 NOTE — H&P
"[facility] HOSPITALIST     Provider, No Known    CHIEF COMPLAINT: right sided chest pain    HISTORY OF PRESENT ILLNESS:  The patient is a 33-year-old male who presented to the emergency department secondary to right sided chest pain that has been ongoing for \"years\" but worsened in the past 2 to 3 days. He decided to come to ER today since he was already here with his friend's mother who is admitted (who he lives with). He reports first episode of PE and DVT was in 2016 and was told he would take blood thinners for life.  Approximately 1 year ago he had recurrence of PE and was placed on Xarelto 15 mg tablets.  He reports that he did not feel they were working because his left leg remained swollen so he stopped taking it approximately 2 months ago.  2 weeks ago he was admitted at [facility] for \"mental health crisis\" and was incidentally diagnosed with recurrent PE and started on 20 mg Xarelto.  He notes no starter pack was given and that he has been compliant for approximately 1-1/2 to 2 weeks on Xarelto 20 mg tablets.  He reports he has never had hematology referral for further investigation.  He also reports PE and DVTs were unprovoked.    He reports that he has had a very low appetite over the past 2 to 3 years and is lost 200 pounds.    He vapes and smokes cigars, drinks alcohol occasionally and uses marijuana \"as much as I can\" with last use approximately 3 days ago.  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      Only significant lab in ER is glucose 160.  CTA chest shows likely right lower lobe small PE.  Admission was requested due to Xarelto failure.    He has a history of PE/DVT, atrial fibrillation, hyperlipidemia, hypertension, DM 2, obesity    He otherwise currently denies f/c/headache/rhinorrhea/nasal congestion/lightheadedness/syncopal sensation/cough/soa/n/v/d/chest pain/abdominal pain/recent illness/sick exposures/change in bowel or bladder habits/bloody emesis or " "bloody stools or any other new concerns.    Past Medical History:   Diagnosis Date    A-fib     Anxiety     Diabetes mellitus     Hyperlipidemia      History reviewed. No pertinent surgical history.  History reviewed. No pertinent family history.  Social History     Tobacco Use    Smoking status: Some Days     Types: Cigarettes   Vaping Use    Vaping status: Every Day   Substance Use Topics    Drug use: Yes     Types: Marijuana     Medications Prior to Admission   Medication Sig Dispense Refill Last Dose    atorvastatin (LIPITOR) 20 MG tablet Take 1 tablet by mouth Daily.   7/9/2024    Cariprazine HCl (VRAYLAR) 3 MG capsule capsule Take 10 mg by mouth Daily.   7/9/2024    dilTIAZem (CARDIZEM) 60 MG tablet Take 1 tablet by mouth 1 (One) Time.   7/9/2024    gabapentin (NEURONTIN) 300 MG capsule Take 1 capsule by mouth 2 (Two) Times a Day.   7/9/2024    metFORMIN (GLUCOPHAGE) 1000 MG tablet Take 1 tablet by mouth 2 (Two) Times a Day With Meals.   7/9/2024    rivaroxaban (XARELTO) 20 MG tablet Take 1 tablet by mouth Daily.   7/9/2024     Allergies:  Ritalin [methylphenidate] and Bee venom      There is no immunization history on file for this patient.    REVIEW OF SYSTEMS:  Please see the above history of present illness for pertinent positives and negatives.  The remainder of the patient's systems have been reviewed and are negative.     Vital Signs  Temp:  [97.2 °F (36.2 °C)-98.3 °F (36.8 °C)] 97.2 °F (36.2 °C)  Heart Rate:  [74-88] 74  Resp:  [16-18] 18  BP: (140-141)/(92-96) 140/96  Body mass index is 40.66 kg/m².    Flowsheet Rows      Flowsheet Row First Filed Value   Admission Height 177.8 cm (70\") Documented at 07/09/2024 2327   Admission Weight 131 kg (288 lb) Documented at 07/09/2024 2327               Physical Exam  Vitals reviewed.   Constitutional:       General: He is not in acute distress.     Appearance: He is obese. He is not ill-appearing.   HENT:      Head: Normocephalic and atraumatic.      " Mouth/Throat:      Mouth: Mucous membranes are moist.   Eyes:      Extraocular Movements: Extraocular movements intact.      Pupils: Pupils are equal, round, and reactive to light.   Cardiovascular:      Rate and Rhythm: Normal rate and regular rhythm.   Pulmonary:      Effort: Pulmonary effort is normal. No respiratory distress.      Breath sounds: Normal breath sounds. No wheezing or rales.   Abdominal:      General: Abdomen is flat. Bowel sounds are normal. There is no distension.      Palpations: Abdomen is soft.      Tenderness: There is no abdominal tenderness. There is no guarding.   Musculoskeletal:         General: Swelling present.      Comments: Left greater than right calf swelling, right calf more tender than left, positive Homans   Skin:     General: Skin is warm and dry.      Capillary Refill: Capillary refill takes less than 2 seconds.      Findings: No erythema.      Comments: Extensive tattooing of skin over all skin surfaces with scattered areas of scabbing and scarring noted   Neurological:      General: No focal deficit present.      Mental Status: He is alert and oriented to person, place, and time.   Psychiatric:      Comments: Calm, cooperative       Emotional Behavior:    Judgment and Insight: Fair   Mental Status:  Alertness alert   Memory: Good   Mood and Affect:         Depression none               Anxiety none    Debilities:   Physical Weakness none obvious   Handicaps unknown   Disabilities unknown   Agitation none     Results Review:    I reviewed the patient's new clinical results.  Lab Results (most recent)       Procedure Component Value Units Date/Time    Lipase [[id number]]  (Normal) Collected: 07/09/24 2341    Specimen: Blood Updated: 07/10/24 0009     Lipase 28 U/L     High Sensitivity Troponin T [[id number]]  (Normal) Collected: 07/09/24 2341    Specimen: Blood Updated: 07/10/24 0009     HS Troponin T 10 ng/L     Narrative:      High Sensitive Troponin T Reference Range:  <14.0  ng/L- Negative Female for AMI  <22.0 ng/L- Negative Male for AMI  >=14 - Abnormal Female indicating possible myocardial injury.  >=22 - Abnormal Male indicating possible myocardial injury.   Clinicians would have to utilize clinical acumen, EKG, Troponin, and serial changes to determine if it is an Acute Myocardial Infarction or myocardial injury due to an underlying chronic condition.         Comprehensive Metabolic Panel [[id number]]  (Abnormal) Collected: 07/09/24 2341    Specimen: Blood Updated: 07/10/24 0009     Glucose 160 mg/dL      BUN 9 mg/dL      Creatinine 1.06 mg/dL      Sodium 143 mmol/L      Potassium 3.6 mmol/L      Chloride 108 mmol/L      CO2 20.5 mmol/L      Calcium 9.0 mg/dL      Total Protein 7.0 g/dL      Albumin 4.0 g/dL      ALT (SGPT) 18 U/L      AST (SGOT) 17 U/L      Alkaline Phosphatase 79 U/L      Total Bilirubin 0.4 mg/dL      Globulin 3.0 gm/dL      A/G Ratio 1.3 g/dL      BUN/Creatinine Ratio 8.5     Anion Gap 14.5 mmol/L      eGFR 95.6 mL/min/1.73     Narrative:      GFR Normal >60  Chronic Kidney Disease <60  Kidney Failure <15      Protime-INR [[id number]]  (Normal) Collected: 07/09/24 2341    Specimen: Blood Updated: 07/10/24 0007     Protime 13.4 Seconds      INR 1.02    Narrative:      Therapeutic Ranges for INR: 2.0-3.0 (PT 20-30)                              2.5-3.5 (PT 25-34)    CBC & Differential [[id number]]  (Normal) Collected: 07/09/24 2341    Specimen: Blood Updated: 07/09/24 2349    Narrative:      The following orders were created for panel order CBC & Differential.  Procedure                               Abnormality         Status                     ---------                               -----------         ------                     CBC Auto Differential[[id number]]        Normal              Final result                 Please view results for these tests on the individual orders.    CBC Auto Differential [[id number]]  (Normal) Collected: 07/09/24 2341    Specimen:  Blood Updated: 07/09/24 2349     WBC 7.85 10*3/mm3      RBC 4.83 10*6/mm3      Hemoglobin 14.0 g/dL      Hematocrit 42.3 %      MCV 87.6 fL      MCH 29.0 pg      MCHC 33.1 g/dL      RDW 13.0 %      RDW-SD 43.1 fl      MPV 10.7 fL      Platelets 189 10*3/mm3      Neutrophil % 52.1 %      Lymphocyte % 33.1 %      Monocyte % 9.7 %      Eosinophil % 4.3 %      Basophil % 0.3 %      Immature Grans % 0.5 %      Neutrophils, Absolute 4.09 10*3/mm3      Lymphocytes, Absolute 2.60 10*3/mm3      Monocytes, Absolute 0.76 10*3/mm3      Eosinophils, Absolute 0.34 10*3/mm3      Basophils, Absolute 0.02 10*3/mm3      Immature Grans, Absolute 0.04 10*3/mm3             Imaging Results (Most Recent)       Procedure Component Value Units Date/Time    CT Angiogram Chest [[id number]] Collected: 07/10/24 0031     Updated: 07/10/24 0037    Narrative:      CT ANGIOGRAM CHEST    Date of Exam: 7/10/2024 12:05 AM EDT    Indication: chest pain, hx of PE with similar presentation 1 year ago.    Comparison: None available.    Technique: CTA of the chest was performed before and after the uneventful intravenous administration of iodinated contrast. Reconstructed coronal and sagittal images were also obtained. In addition, a 3-D volume rendered image was created for   interpretation. Automated exposure control and iterative reconstruction methods were used.      Findings:  There is a suboptimal contrast bolus in the pulmonary artery. Despite this, there does appear to be high likelihood of a small pulmonary embolism within a segmental branch pulmonary artery to the right lower lobe posteriorly best seen on images 88-90. No   large central or saddle embolus. No evidence of right heart strain. There is no pneumothorax, pleural effusion or focal airspace consolidation. No significant pleural disease. Airways are patent.    Thyroid, trachea, esophagus, heart size, aorta, aortic branch vessels, main pulmonary artery and coronary arteries appear within  normal limits. No coronary artery calcification. No mediastinal lymphadenopathy or pericardial effusion.    No acute findings in the superficial soft tissues or within the limited images of the upper abdomen. No acute osseous abnormality or destructive bone lesion. No significant thoracic degenerative changes.      Impression:      Impression:  Suboptimal contrast bolus, but there is high likelihood of a small pulmonary embolism within a segmental branch pulmonary artery to the right lower lobe. No evidence of right heart strain.        Electronically Signed: Farrukh Chisholm MD    7/10/2024 12:34 AM EDT    Workstation ID: [id number]          reviewed    ECG/EMG Results (most recent)       Procedure Component Value Units Date/Time    ECG 12 Lead Chest Pain [[id number]] Collected: 07/09/24 2324     Updated: 07/09/24 2333     QT Interval 343 ms      QTC Interval 402 ms     Narrative:      HEART RATE=82  bpm  RR Trtdvczi=988  ms  KY Bvofxykg=140  ms  P Horizontal Axis=16  deg  P Front Axis=69  deg  QRSD Interval=98  ms  QT Urlfgakq=763  ms  PNhU=724  ms  QRS Axis=37  deg  T Wave Axis=32  deg  - ABNORMAL ECG -  Sinus rhythm  Nonspecific T abnormalities, lateral leads  ST elev, probable normal early repol pattern  Date and Time of Study:2024-07-09 23:24:56          reviewed    Assessment & Plan   Acute/recurrent RLL PE:  Rule out DVT:  Start lovenox, pharmacy to dose  Check venous duplex LEs in am  Consult case management for insurance coverage for other agents  Questionable Xarelto failure when patient reports that he stopped taking medication and was not on appropriate dosing  Add tylenol and tramadol for pain    DM2 in obese with hyperglycemia:  Body mass index is 40.66 kg/m².  Check A1C  TSH normal  Add home metformin    Hypertension: Add home diltiazem  Unknown exact dosing regimen    Hyperlipidemia: Add home Lipitor    Personal history of atrial fibrillation: Diltiazem as above    Illicit drug use: UDS positive THC and  "amphetamines    I discussed the patient's findings and my recommendations with patient and staff.     Laura SOSAJuanpablo Jack, APRN  07/10/24  02:48 EDT    \"Dictated utilizing Dragon dictation\"    Note disclaimer: At [facility], we believe that sharing information builds trust and better relationships. You are receiving this note because you recently visited [facility]. It is possible you will see health information before a provider has talked with you about it. This kind of information can be easy to misunderstand. To help you fully understand what it means for your health, we urge you to discuss this note with your provider.        "

## 2024-07-10 NOTE — PROGRESS NOTES
Pharmacy apixaban dosing recommendation    New order for apixaban starting today at 12pm. Pt is leaving before scheduled dose. Last dose of lovenox was @0244 (q12h dosing) this morning. Spoke to Nurse Piper (after consulting with Dr Hazel) to give pt the first dose of apixaban now and instruct pt to begin taking medication at 12pm today

## 2024-07-11 ENCOUNTER — READMISSION MANAGEMENT (OUTPATIENT)
Dept: CALL CENTER | Facility: HOSPITAL | Age: 34
End: 2024-07-11
Payer: COMMERCIAL

## 2024-07-11 NOTE — OUTREACH NOTE
Prep Survey      Flowsheet Row Responses   [facility] patient discharged from? [facility]   Is LACE score < 7 ? Yes   Eligibility Readm Mgmt   Discharge diagnosis Pulmonary embolism   Does the patient have one of the following disease processes/diagnoses(primary or secondary)? Other   Does the patient have Home health ordered? No   Is there a DME ordered? No   Medication alerts for this patient see avs--eliquis   Prep survey completed? Yes            Winter MENDEZ - Registered Nurse

## 2024-07-12 ENCOUNTER — APPOINTMENT (OUTPATIENT)
Dept: GENERAL RADIOLOGY | Facility: HOSPITAL | Age: 34
End: 2024-07-12
Payer: COMMERCIAL

## 2024-07-12 ENCOUNTER — HOSPITAL ENCOUNTER (EMERGENCY)
Facility: HOSPITAL | Age: 34
Discharge: HOME | End: 2024-07-12
Attending: EMERGENCY MEDICINE
Payer: COMMERCIAL

## 2024-07-12 VITALS
OXYGEN SATURATION: 98 % | HEART RATE: 85 BPM | RESPIRATION RATE: 18 BRPM | BODY MASS INDEX: 41.92 KG/M2 | DIASTOLIC BLOOD PRESSURE: 92 MMHG | HEIGHT: 69 IN | TEMPERATURE: 98.3 F | WEIGHT: 283 LBS | SYSTOLIC BLOOD PRESSURE: 138 MMHG

## 2024-07-12 DIAGNOSIS — S00.83XA CONTUSION OF FACE, INITIAL ENCOUNTER: Primary | ICD-10-CM

## 2024-07-12 PROCEDURE — 70150 X-RAY EXAM OF FACIAL BONES: CPT

## 2024-07-12 PROCEDURE — 99283 EMERGENCY DEPT VISIT LOW MDM: CPT

## 2024-07-12 NOTE — ED PROVIDER NOTES
Subjective   History of Present Illness  Patient presents complaining of nose pain after getting hit with a hammer a week ago.  Patient was climbing on a ladder and there was a hammer sitting on upper leg and it fell off and hit the patient in the face across his nasal bridge and under his right eye.  Patient said he had some mild bleeding at the time and it stopped.  Patient said he seemed to be healing well but tonight he was playing with a family member and they hit his nose and the pain is severe and back again.  No therapy taken prior to arrival.  Nothing seems to make it better or worse.  Patient noted no deformity or black eyes at the first injury.      Review of Systems   All other systems reviewed and are negative.      Past Medical History:   Diagnosis Date    A-fib     Anxiety     Diabetes mellitus     Hyperlipidemia        Allergies   Allergen Reactions    Ritalin [Methylphenidate] Anaphylaxis    Bee Venom Hives and Swelling       History reviewed. No pertinent surgical history.    History reviewed. No pertinent family history.    Social History     Socioeconomic History    Marital status: Single   Tobacco Use    Smoking status: Some Days     Types: Cigarettes   Vaping Use    Vaping status: Every Day   Substance and Sexual Activity    Drug use: Yes     Types: Marijuana           Objective   Physical Exam  Vitals and nursing note reviewed.   HENT:      Head: Normocephalic.      Right Ear: External ear normal.      Left Ear: External ear normal.      Nose: Nose normal. No congestion or rhinorrhea.      Comments: No bruising, swelling, or deformity.  No septal hematoma.     Mouth/Throat:      Mouth: Mucous membranes are moist.   Eyes:      Extraocular Movements: Extraocular movements intact.      Conjunctiva/sclera: Conjunctivae normal.      Pupils: Pupils are equal, round, and reactive to light.   Pulmonary:      Effort: Pulmonary effort is normal.   Musculoskeletal:      Cervical back: Neck supple.    Skin:     General: Skin is warm and dry.      Capillary Refill: Capillary refill takes 2 to 3 seconds.   Neurological:      Mental Status: He is alert and oriented to person, place, and time.   Psychiatric:         Mood and Affect: Mood normal.         Behavior: Behavior normal.         Procedures           ED Course                                             Medical Decision Making  Ddx contusion, fracture, abrasion    XR Facial Bones 3+ View    Result Date: 7/12/2024  Impression: No definite facial bone fracture. Nasal bones are overexposed. If there is concern for nasal bone fracture, consider dedicated imaging. Electronically Signed: Farrukh Chisholm MD  7/12/2024 1:34 AM EDT  Workstation ID: [id number]            Final diagnoses:   Contusion of face, initial encounter       ED Disposition  ED Disposition       ED Disposition   Discharge    Condition   Stable    Comment   --               [id number] [phone]             Medication List      No changes were made to your prescriptions during this visit.            Jaylen Shelley MD  07/12/24 0152

## 2024-07-17 ENCOUNTER — READMISSION MANAGEMENT (OUTPATIENT)
Dept: CALL CENTER | Facility: HOSPITAL | Age: 34
End: 2024-07-17
Payer: COMMERCIAL

## 2024-07-17 NOTE — OUTREACH NOTE
LAG < 7 Survey      Flowsheet Row Responses   [facility] patient discharged from? [place]   Does the patient have one of the following disease processes/diagnoses(primary or secondary)? Other   BHLAG <7 Attempt successful? Yes   Call start time 1540   Call end time 1542   Discharge diagnosis Pulmonary embolism   Meds reviewed with patient/caregiver? Yes   Is the patient having any side effects they believe may be caused by any medication additions or changes? No   Does the patient have all medications ordered at discharge? No   What is keeping the patient from filling the prescriptions? Pre-authorization in progress  [[employer] needs prior authorization]   Is the patient taking all medications as directed (includes completed medication regime)? Yes   Does the patient have a primary care provider?  No   Does the patient have an appointment with their PCP within 7 days of discharge? No   Comments regarding PCP Patient stated he has info needed to obatin PCP   Nursing Interventions Educated patient on importance of making appointment, Advised patient to make appointment   Has the patient kept scheduled appointments due by today? N/A   Has home health visited the patient within 72 hours of discharge? N/A   Psychosocial issues? No   Did the patient receive a copy of their discharge instructions? Yes   Nursing interventions Reviewed instructions with patient   What is the patient's perception of their health status since discharge? Improving   Is the patient/caregiver able to teach back signs and symptoms related to disease process for when to call PCP? Yes   Is the patient/caregiver able to teach back signs and symptoms related to disease process for when to call 911? Yes   Is the patient/caregiver able to teach back the hierarchy of who to call/visit for symptoms/problems? PCP, Specialist, Home health nurse, Urgent Care, ED, 911 Yes   If the patient is a current smoker, are they able to teach back resources for  cessation? Smoking cessation medications  [Vapes, and stated he has no desire to quit]   Graduated Yes   Is the patient interested in additional calls from an ambulatory ? No   Wrap up additional comments Patient reports [employer] needs prior auth, he does not have PCP but stated he had information needed to obtain PCP.            [place]ira GRIJALVA - Registered Nurse